# Patient Record
Sex: FEMALE
[De-identification: names, ages, dates, MRNs, and addresses within clinical notes are randomized per-mention and may not be internally consistent; named-entity substitution may affect disease eponyms.]

---

## 2021-11-16 ENCOUNTER — APPOINTMENT (OUTPATIENT)
Dept: INTERNAL MEDICINE | Facility: CLINIC | Age: 21
End: 2021-11-16

## 2024-03-11 ENCOUNTER — APPOINTMENT (OUTPATIENT)
Dept: UROGYNECOLOGY | Facility: CLINIC | Age: 24
End: 2024-03-11
Payer: COMMERCIAL

## 2024-03-11 VITALS
SYSTOLIC BLOOD PRESSURE: 118 MMHG | WEIGHT: 150 LBS | DIASTOLIC BLOOD PRESSURE: 66 MMHG | HEIGHT: 63 IN | HEART RATE: 61 BPM | BODY MASS INDEX: 26.58 KG/M2

## 2024-03-11 DIAGNOSIS — N89.8 OTHER SPECIFIED NONINFLAMMATORY DISORDERS OF VAGINA: ICD-10-CM

## 2024-03-11 PROCEDURE — 99202 OFFICE O/P NEW SF 15 MIN: CPT

## 2024-03-11 NOTE — DISCUSSION/SUMMARY
[FreeTextEntry1] : I reviewed the above findings with the patient.  There was no evidence of urinary retention.  We discussed that I thought the insensible urinary incontinence that she is finding on her panty liner at the end of day I thought was normal physiologic discharge.  She has no urinary symptoms or findings that lead to urinary incontinence. She also had some difficulty with exam due to his nervousness she has not been sexually active again and questions regarding her first sexual experience after she got  we discussed all questions answered She will follow-up with her OB/GYN and follow-up.  As-needed basis

## 2024-03-11 NOTE — HISTORY OF PRESENT ILLNESS
[Rectal Prolapse] : no [Stress Incontinence] : no [Unable To Restrain Bowel Movement] : no [Urinary Frequency] : no [Feelings Of Urinary Urgency] : no [Urinary Frequency More Than Twice At Night (Nocturia)] : no nocturia [Urinary Tract Infection] : no [FreeTextEntry4] : + menses [FreeTextEntry1] : has been wearing one sometimes 2 panty liner daily for the past 8 years.  ? insensible loss of urine.  pt is getting  in April  pt has tried meds foe constipation in the past and also saw a  pediatric urologist and was given a medication maybe oxybutynin both of which di not help. She denies vaginal discharge or vaginal itching.

## 2024-03-12 RX ORDER — NORETHINDRONE ACETATE AND ETHINYL ESTRADIOL AND FERROUS FUMARATE 1MG-20(24)
KIT ORAL
Refills: 0 | Status: ACTIVE | COMMUNITY

## 2025-09-04 ENCOUNTER — APPOINTMENT (OUTPATIENT)
Dept: ULTRASOUND IMAGING | Facility: HOSPITAL | Age: 25
End: 2025-09-04

## 2025-09-04 ENCOUNTER — RESULT REVIEW (OUTPATIENT)
Age: 25
End: 2025-09-04

## 2025-09-04 ENCOUNTER — OUTPATIENT (OUTPATIENT)
Dept: OUTPATIENT SERVICES | Facility: HOSPITAL | Age: 25
LOS: 1 days | End: 2025-09-04
Payer: COMMERCIAL

## 2025-09-04 DIAGNOSIS — Z00.00 ENCOUNTER FOR GENERAL ADULT MEDICAL EXAMINATION WITHOUT ABNORMAL FINDINGS: ICD-10-CM

## 2025-09-04 PROCEDURE — 76831 ECHO EXAM UTERUS: CPT

## 2025-09-04 PROCEDURE — 76377 3D RENDER W/INTRP POSTPROCES: CPT | Mod: 26

## 2025-09-04 PROCEDURE — 76831 ECHO EXAM UTERUS: CPT | Mod: 26

## 2025-09-04 PROCEDURE — 58340 CATHETER FOR HYSTEROGRAPHY: CPT

## 2025-09-04 PROCEDURE — 76377 3D RENDER W/INTRP POSTPROCES: CPT
